# Patient Record
Sex: MALE | Race: WHITE
[De-identification: names, ages, dates, MRNs, and addresses within clinical notes are randomized per-mention and may not be internally consistent; named-entity substitution may affect disease eponyms.]

---

## 2017-12-13 ENCOUNTER — HOSPITAL ENCOUNTER (EMERGENCY)
Dept: HOSPITAL 17 - NED | Age: 52
Discharge: LEFT BEFORE DISCHARGE | End: 2017-12-13
Payer: SELF-PAY

## 2017-12-13 ENCOUNTER — HOSPITAL ENCOUNTER (OUTPATIENT)
Dept: HOSPITAL 17 - NEPC | Age: 52
Setting detail: OBSERVATION
LOS: 1 days | Discharge: HOME | End: 2017-12-14
Attending: INTERNAL MEDICINE | Admitting: INTERNAL MEDICINE
Payer: SELF-PAY

## 2017-12-13 VITALS
HEART RATE: 94 BPM | OXYGEN SATURATION: 97 % | SYSTOLIC BLOOD PRESSURE: 200 MMHG | TEMPERATURE: 98.9 F | DIASTOLIC BLOOD PRESSURE: 117 MMHG

## 2017-12-13 VITALS
TEMPERATURE: 97.8 F | SYSTOLIC BLOOD PRESSURE: 168 MMHG | RESPIRATION RATE: 17 BRPM | OXYGEN SATURATION: 99 % | DIASTOLIC BLOOD PRESSURE: 89 MMHG | HEART RATE: 83 BPM

## 2017-12-13 VITALS
HEART RATE: 89 BPM | OXYGEN SATURATION: 99 % | SYSTOLIC BLOOD PRESSURE: 197 MMHG | TEMPERATURE: 98.2 F | DIASTOLIC BLOOD PRESSURE: 107 MMHG | RESPIRATION RATE: 16 BRPM

## 2017-12-13 VITALS — BODY MASS INDEX: 29.98 KG/M2 | WEIGHT: 202.38 LBS | HEIGHT: 69 IN

## 2017-12-13 VITALS — HEART RATE: 88 BPM

## 2017-12-13 VITALS
OXYGEN SATURATION: 96 % | TEMPERATURE: 97.4 F | HEART RATE: 84 BPM | RESPIRATION RATE: 18 BRPM | DIASTOLIC BLOOD PRESSURE: 99 MMHG | SYSTOLIC BLOOD PRESSURE: 172 MMHG

## 2017-12-13 DIAGNOSIS — Z79.899: ICD-10-CM

## 2017-12-13 DIAGNOSIS — R06.02: ICD-10-CM

## 2017-12-13 DIAGNOSIS — R07.89: Primary | ICD-10-CM

## 2017-12-13 DIAGNOSIS — R94.31: ICD-10-CM

## 2017-12-13 DIAGNOSIS — M10.9: ICD-10-CM

## 2017-12-13 DIAGNOSIS — R53.83: ICD-10-CM

## 2017-12-13 DIAGNOSIS — I10: ICD-10-CM

## 2017-12-13 DIAGNOSIS — F17.200: ICD-10-CM

## 2017-12-13 DIAGNOSIS — R07.9: Primary | ICD-10-CM

## 2017-12-13 DIAGNOSIS — M54.31: ICD-10-CM

## 2017-12-13 DIAGNOSIS — Z82.49: ICD-10-CM

## 2017-12-13 LAB
ALP SERPL-CCNC: 92 U/L (ref 45–117)
ALT SERPL-CCNC: 29 U/L (ref 12–78)
ANION GAP SERPL CALC-SCNC: 8 MEQ/L (ref 5–15)
APTT BLD: 28.5 SEC (ref 24.3–30.1)
AST SERPL-CCNC: 21 U/L (ref 15–37)
BASOPHILS # BLD AUTO: 0.1 TH/MM3 (ref 0–0.2)
BASOPHILS NFR BLD: 1.4 % (ref 0–2)
BILIRUB SERPL-MCNC: 0.2 MG/DL (ref 0.2–1)
BUN SERPL-MCNC: 13 MG/DL (ref 7–18)
CHLORIDE SERPL-SCNC: 102 MEQ/L (ref 98–107)
CK MB SERPL-MCNC: 2.9 NG/ML (ref 0.5–3.6)
CK MB SERPL-MCNC: 3.3 NG/ML (ref 0.5–3.6)
CK MB SERPL-MCNC: 4.2 NG/ML (ref 0.5–3.6)
CK SERPL-CCNC: 172 U/L (ref 39–308)
CK SERPL-CCNC: 174 U/L (ref 39–308)
CK SERPL-CCNC: 211 U/L (ref 39–308)
EOSINOPHIL # BLD: 0.1 TH/MM3 (ref 0–0.4)
EOSINOPHIL NFR BLD: 0.6 % (ref 0–4)
ERYTHROCYTE [DISTWIDTH] IN BLOOD BY AUTOMATED COUNT: 13.4 % (ref 11.6–17.2)
GFR SERPLBLD BASED ON 1.73 SQ M-ARVRAT: 97 ML/MIN (ref 89–?)
HCO3 BLD-SCNC: 25.4 MEQ/L (ref 21–32)
HCT VFR BLD CALC: 40.7 % (ref 39–51)
HEMO FLAGS: (no result)
INR PPP: 1.1 RATIO
LYMPHOCYTES # BLD AUTO: 2.5 TH/MM3 (ref 1–4.8)
LYMPHOCYTES NFR BLD AUTO: 26.8 % (ref 9–44)
MAGNESIUM SERPL-MCNC: 2 MG/DL (ref 1.5–2.5)
MCH RBC QN AUTO: 29.7 PG (ref 27–34)
MCHC RBC AUTO-ENTMCNC: 34.1 % (ref 32–36)
MCV RBC AUTO: 86.9 FL (ref 80–100)
MONOCYTES NFR BLD: 7.3 % (ref 0–8)
NEUTROPHILS # BLD AUTO: 6 TH/MM3 (ref 1.8–7.7)
NEUTROPHILS NFR BLD AUTO: 63.9 % (ref 16–70)
PLATELET # BLD: 405 TH/MM3 (ref 150–450)
POTASSIUM SERPL-SCNC: 3.8 MEQ/L (ref 3.5–5.1)
PROTHROMBIN TIME: 10.7 SEC (ref 9.8–11.6)
RBC # BLD AUTO: 4.69 MIL/MM3 (ref 4.5–5.9)
SODIUM SERPL-SCNC: 135 MEQ/L (ref 136–145)
WBC # BLD AUTO: 9.3 TH/MM3 (ref 4–11)

## 2017-12-13 PROCEDURE — A9502 TC99M TETROFOSMIN: HCPCS

## 2017-12-13 PROCEDURE — 82552 ASSAY OF CPK IN BLOOD: CPT

## 2017-12-13 PROCEDURE — 85730 THROMBOPLASTIN TIME PARTIAL: CPT

## 2017-12-13 PROCEDURE — 84484 ASSAY OF TROPONIN QUANT: CPT

## 2017-12-13 PROCEDURE — 93005 ELECTROCARDIOGRAM TRACING: CPT

## 2017-12-13 PROCEDURE — 93017 CV STRESS TEST TRACING ONLY: CPT

## 2017-12-13 PROCEDURE — 85025 COMPLETE CBC W/AUTO DIFF WBC: CPT

## 2017-12-13 PROCEDURE — 85610 PROTHROMBIN TIME: CPT

## 2017-12-13 PROCEDURE — 99285 EMERGENCY DEPT VISIT HI MDM: CPT

## 2017-12-13 PROCEDURE — 82550 ASSAY OF CK (CPK): CPT

## 2017-12-13 PROCEDURE — 99284 EMERGENCY DEPT VISIT MOD MDM: CPT

## 2017-12-13 PROCEDURE — 71020: CPT

## 2017-12-13 PROCEDURE — 96374 THER/PROPH/DIAG INJ IV PUSH: CPT

## 2017-12-13 PROCEDURE — 80053 COMPREHEN METABOLIC PANEL: CPT

## 2017-12-13 PROCEDURE — 83735 ASSAY OF MAGNESIUM: CPT

## 2017-12-13 PROCEDURE — G0378 HOSPITAL OBSERVATION PER HR: HCPCS

## 2017-12-13 PROCEDURE — 78452 HT MUSCLE IMAGE SPECT MULT: CPT

## 2017-12-13 NOTE — PD
Dr. Ryan at bedside speaking with patient and family.    HPI


Chief Complaint:  Chest Pain


Time Seen by Provider:  11:30


Travel History


International Travel<30 days:  No


Contact w/Intl Traveler<30days:  No


Traveled to known affect area:  No





History of Present Illness


HPI


Patient is a 52-year-old male presenting to the emergency department for 

evaluation of chest pain and shortness of breath.  Patient states he gets 

fatigued when he walks.  This is an ongoing for the last 2 weeks.  He denies 

any upper respiratory symptoms.  He further denies any nausea, vomiting, fever, 

chills.  He reports a history of hypertension.  Patient is a smoker.





[facility]


Past Medical History


Diminished Hearing:  No


Gout:  Yes


Hypertension:  Yes





Past Surgical History


Appendectomy:  Yes


Other Surgery:  Yes (BROEKN ARM TWICE AND BROKEN ANKLE )





Social History


Alcohol Use:  Yes (SIX PACK BEER ONCE A WEEK)


Tobacco Use:  Yes (PACK A DAY)


Substance Use:  No





Allergies-Medications


(Allergen,Severity, Reaction):  


Coded Allergies:  


     No Known Allergies (Verified  Allergy, Unknown, 12/13/17)


Reported Meds & Prescriptions





Reported Meds & Active Scripts


Active


Norvasc (Amlodipine Besylate) 10 Mg Tab 10 Mg PO DAILY 30 Days








Review of Systems


Except as stated in HPI:  all other systems reviewed are Neg


General / Constitutional:  No: Fever, Chills


Eyes:  No: Blurred Vision


HENT:  No: Headaches


Cardiovascular:  Positive: Chest Pain or Discomfort


Respiratory:  Positive: Shortness of Breath


Gastrointestinal:  No: Nausea, Vomiting, Abdominal Pain


Musculoskeletal:  No: Myalgias





Physical Exam


Narrative


GENERAL: Well-developed, well-nourished, alert  male.  Observed in no 

acute distress.


SKIN: Warm and dry.


HEAD: Normocephalic.


EYES: No scleral icterus. No injection or drainage. 


CARDIOVASCULAR: Regular rate 


RESPIRATORY: No accessory muscle use, no nasal flaring, regular respiratory rate


MUSCULOSKELETAL: No cyanosis, or edema. 











Data


Data


Last Documented VS





Vital Signs








  Date Time  Temp Pulse Resp B/P (MAP) Pulse Ox O2 Delivery O2 Flow Rate FiO2


 


12/13/17 12:29        


 


12/13/17 11:40 98.9 94   97   








Orders





 Orders


Electrocardiogram (12/13/17 11:44)


Ckmb (Isoenzyme) Profile (12/13/17 11:44)


Complete Blood Count With Diff (12/13/17 11:44)


Comprehensive Metabolic Panel (12/13/17 11:44)


Magnesium (Mg) (12/13/17 11:44)


Prothrombin Time / Inr (Pt) (12/13/17 11:44)


Act Partial Throm Time (Ptt) (12/13/17 11:44)


Troponin I (12/13/17 11:44)


CKMB (12/13/17 12:00)


CKMB% (12/13/17 12:00)





Labs





Laboratory Tests








Test


  12/13/17


12:00


 


White Blood Count 9.3 TH/MM3 


 


Red Blood Count 4.69 MIL/MM3 


 


Hemoglobin 13.9 GM/DL 


 


Hematocrit 40.7 % 


 


Mean Corpuscular Volume 86.9 FL 


 


Mean Corpuscular Hemoglobin 29.7 PG 


 


Mean Corpuscular Hemoglobin


Concent 34.1 % 


 


 


Red Cell Distribution Width 13.4 % 


 


Platelet Count 405 TH/MM3 


 


Mean Platelet Volume 6.8 FL 


 


Neutrophils (%) (Auto) 63.9 % 


 


Lymphocytes (%) (Auto) 26.8 % 


 


Monocytes (%) (Auto) 7.3 % 


 


Eosinophils (%) (Auto) 0.6 % 


 


Basophils (%) (Auto) 1.4 % 


 


Neutrophils # (Auto) 6.0 TH/MM3 


 


Lymphocytes # (Auto) 2.5 TH/MM3 


 


Monocytes # (Auto) 0.7 TH/MM3 


 


Eosinophils # (Auto) 0.1 TH/MM3 


 


Basophils # (Auto) 0.1 TH/MM3 


 


CBC Comment DIFF FINAL 


 


Differential Comment  


 


Prothrombin Time 10.7 SEC 


 


Prothromb Time International


Ratio 1.1 RATIO 


 


 


Activated Partial


Thromboplast Time 28.5 SEC 


 


 


Blood Urea Nitrogen 13 MG/DL 


 


Creatinine 0.83 MG/DL 


 


Random Glucose 109 MG/DL 


 


Total Protein 9.0 GM/DL 


 


Albumin 3.6 GM/DL 


 


Calcium Level 9.0 MG/DL 


 


Magnesium Level 2.0 MG/DL 


 


Alkaline Phosphatase 92 U/L 


 


Aspartate Amino Transf


(AST/SGOT) 21 U/L 


 


 


Alanine Aminotransferase


(ALT/SGPT) 29 U/L 


 


 


Total Bilirubin 0.2 MG/DL 


 


Sodium Level 135 MEQ/L 


 


Potassium Level 3.8 MEQ/L 


 


Chloride Level 102 MEQ/L 


 


Carbon Dioxide Level 25.4 MEQ/L 


 


Anion Gap 8 MEQ/L 


 


Estimat Glomerular Filtration


Rate 97 ML/MIN 


 


 


Total Creatine Kinase 211 U/L 


 


Creatine Kinase MB 4.2 NG/ML 


 


Troponin I


  LESS THAN 0.02


NG/ML











MDM


Medical Decision Making


Medical Screen Exam Complete:  Yes


Emergency Medical Condition:  Yes


Interpretation(s)





Vital Signs








  Date Time  Temp Pulse Resp B/P (MAP) Pulse Ox O2 Delivery O2 Flow Rate FiO2


 


12/13/17 12:29        


 


12/13/17 11:40 98.9 94  200/117 (144) 97   








Differential Diagnosis


ACS versus stable angina versus pleurisy versus pneumonia versus non-STEMI 

versus other


Narrative Course


Patient was seen in triage, protocol orders placed.  When the patient was 

called for his room he was not to be found in the waiting rooms.





AMA: The risks of leaving against medical advice without further evaluation 

treatment were discussed with the patient.  These risks include cardiac 

dysfunction, cardiac dysrhythmia, possible heart attack, possible stroke or 

death.  The patient indicated understanding of these risks and appeared to have 

the capacity to make this decision.





Diagnosis





 Primary Impression:  


 Left against medical advice


Disposition:  07 AGAINST MEDICAL ADVICE











Smiley Olivares Dec 13, 2017 13:21

## 2017-12-13 NOTE — HHI.HP
HPI


Primary Care Physician


No Primary Care Physician


Chief Complaint


Chest pressure


History of Present Illness


52-year-old male with history of hypertension, current smoker, and gout 

presents to emergency room for further evaluation of chest pressure.  Reports 

getting over an upper respiratory infection and felt chest pressure related to 

cold. However this morning developed severe pressure worse than previous week, 

therefore came to ER for further evaluation. Location substernal and midback 

discomfort "felt like someone squeezing me."  Characterized as pressure. 

Severity moderate.  Radiation to left arm described left arm has "tingling." 

Associated symptoms dyspnea.  Denied nausea, vomiting, or diaphoresis.  

Duration waxed and waned in intensity. Initial episode lasted approximately 5 

minutes.  No known precipitating or relieving factors.  Denies similar pain in 

the past. Endorses he also suffered from claustrophobia and the squeezing 

sensation made him extremely anxious. Also reporting right sciatic pain x1 

month. Reports he is unable to get comfortable despite alternating ice, heat, 

and using Motrin. Does not have a PCP at this time. No weakness of extremity.





Review of Systems


General: No fatigue,weakness, fever, chills, or change in appetite.  Recent 

illness 1 week, reporting symptoms are resolving.


HEENT: No HA, no vision changes, no nasal congestion or drainage, no dysphasia


CV: As stated above.  Really chest pain-free. 


RESP: Dyspnea has resolved.  Recently upper respiratory infection, current 

smoker. No sputum production.


GI: No nausea or vomiting, bowel changes, diarrhea, constipation, pain, 

distention, melena, blood in the stool.  No unintentional weight gain or weight 

loss.


: No dysuria, urgency, frequency


EXT: No lower leg edema, no paraesthesias, reporting history of right sciatic 

pain x1 month


MS: No discomfort or change in ROM. No known injury or trauma. No past cervical 

or lumbar problems. History of DDD. 


NEURO: No difficulty with balance, LOC, motor/sensory deficits


PSYCH: No anxiety, depression.


SKIN: No rashes, no concerning lesions





Past Family Social History


Allergies:  


Coded Allergies:  


     No Known Allergies (Verified  Allergy, Unknown, 17)


Past Medical History


Hypertension (obtains his BP medication from a friend), gout


Past Surgical History


None


Reported Medications





Reported Meds & Active Scripts


Active


Reported


Norvasc (Amlodipine Besylate) 10 Mg Tab 10 Mg PO DAILY


MVI QD


Active Ordered Medications





Current Medications








 Medications


  (Trade)  Dose


 Ordered  Sig/Mary


 Route  Start Time


 Stop Time Status Last Admin


 


  (NS Flush)  2 ml  UNSCH  PRN


 IV FLUSH  17 15:15


     


 


 


  (NS Flush)  2 ml  BID


 IV FLUSH  17 21:00


     


 


 


  (Tylenol)  500 mg  Q4H  PRN


 PO  17 15:15


     


 


 


  (Zofran Inj)  4 mg  Q6H  PRN


 IV PUSH  17 15:15


     


 


 


  (Nitrostat Sl)  0.4 mg  Q5M  PRN


 SL  17 15:15


     


 


 


  (Aspirin)  325 mg  DAILY


 PO  17 09:00


     


 








Family History


Noncontributory for early onset cardiovascular disease.  Father  CABG later in 

life, still living.


Social History


Known hypertension.  No known coronary artery disease, diabetes, or 

hyperlipidemia.


Current smoker, 25-45 pack year history.  Denies any alcohol or illegal drug 

use.


Versus inactive lifestyle.  Works part-time as a [employer].





Past cardiac testing


None





Physical Exam


Vital Signs





Vital Signs








  Date Time  Temp Pulse Resp B/P (MAP) Pulse Ox O2 Delivery O2 Flow Rate FiO2


 


17 14:41   20   Room Air  


 


17 13:23 98.2 89 16 197/107 (137) 99   








Physical Exam


GENERAL: Alert WN, WD, NAD,  male


HEAD: NC, AT


EYES: Sclera clear, conjunctiva without injection, pupils equal and round


ENT: Mucous membranes pink and moist


CV: RRR, without murmur, rub, gallop, no JVD, S1-S2 no S3-S4.  Chest wall pain 

left anterior and substernal with palpation.


RESP: Coarse bilateral lower lobes, Clear lungs upper. No wheeze. symmetrical 

chest rise, nonlabored, able to speak in full sentences


ABD: Soft, NT, ND, no masses, positive bowel tones


EXT: Pulses +24, no dependent edema


MS: Normal tone 4 extremities, nontender, no obvious deformities, full range 

of motion


NEURO: CN II through CN XII grossly intact, motor strength 5/5


PSYCH: A+O 3, appropriate speech, mood, affect, insight and judgment, appears 

mildly anxious.


SKIN: Normal turgor, normal texture, no lesions, no rashes, brisk cap refill, 

even hair distribution


Laboratory


CBC unremarkable, CMP unremarkable.  First troponin negative.


Imaging





Last Impressions








Chest X-Ray 17 0000 Signed





Impressions: 





 Service Date/Time:  2017 13:41 - CONCLUSION:  1. No 





 acute cardiopulmonary disease.     Alexandre Rock MD 








Course


EKG


Normal sinus rhythm, normal axis, no ST or T-segment changes





Caprini VTE Risk Assessment


Caprini VTE Risk Assessment:  No/Low Risk (score <= 1)


Caprini Risk Assessment Model











 Point Value = 1          Point Value = 2  Point Value = 3  Point Value = 5


 


Age 41-60


Minor surgery


BMI > 25 kg/m2


Swollen legs


Varicose veins


Pregnancy or postpartum


History of unexplained or recurrent


   spontaneous 


Oral contraceptives or hormone


   replacement


Sepsis (< 1 month)


Serious lung disease, including


   pneumonia (< 1 month)


Abnormal pulmonary function


Acute myocardial infarction


Congestive heart failure (< 1 month)


History of inflammatory bowel disease


Medical patient at bed rest Age 61-74


Arthroscopic surgery


Major open surgery (> 45 min)


Laparoscopic surgery (> 45 min)


Malignancy


Confined to bed (> 72 hours)


Immobilizing plaster cast


Central venous access Age >= 75


History of VTE


Family history of VTE


Factor V Leiden


Prothrombin 81935D


Lupus anticoagulant


Anticardiolipin antibodies


Elevated serum homocysteine


Heparin-induced thrombocytopenia


Other congenital or acquired


   thrombophilia Stroke (< 1 month)


Elective arthroplasty


Hip, pelvis, or leg fracture


Acute spinal cord injury (< 1 month)








Prophylaxis Regimen











   Total Risk


Factor Score Risk Level Prophylaxis Regimen


 


0-1      Low Early ambulation


 


2 Moderate Order ONE of the following:


*Sequential Compression Device (SCD)


*Heparin 5000 units SQ BID


 


3-4 Higher Order ONE of the following medications:


*Heparin 5000 units SQ TID


*Enoxaparin/Lovenox 40 mg SQ daily (WT < 150 kg, CrCl > 30 mL/min)


*Enoxaparin/Lovenox 30 mg SQ daily (WT < 150 kg, CrCl > 10-29 mL/min)


*Enoxaparin/Lovenox 30 mg SQ BID (WT < 150 kg, CrCl > 30 mL/min)


AND/OR


*Sequential Compression Device (SCD)


 


5 or more Highest Order ONE of the following medications:


*Heparin 5000 units SQ TID (Preferred with Epidurals)


*Enoxaparin/Lovenox 40 mg SQ daily (WT < 150 kg, CrCl > 30 mL/min)


*Enoxaparin/Lovenox 30 mg SQ daily (WT < 150 kg, CrCl > 10-29 mL/min)


*Enoxaparin/Lovenox 30 mg SQ BID (WT < 150 kg, CrCl > 30 mL/min)


AND


*Sequential Compression Device (SCD)











Assessment and Plan


Assessment and Plan


#1 Atypical chest pain-admitted chest pain center.  Ruled out with 3 sets of 

EKGs, cardiac enzymes, and monitored overnight.  Seen and evaluated by Dr. Lukasz Wood.  If ruled out, plan is to complete a chemical stress test in 

a.m.  If cardiac testing unremarkable, plans to discharge home. Encouraged and 

stressed the importance of establishing with a primary care provider.  

Discussed in length local health clinic's available to the uninsured. 


#2 Hypertension-continue amlodipine, refill prescription at discharge. 


#3 Tobacco use-strongly encouraged and stressed the importance of tobacco 

cessation.  Instructed him to quit smoking.


#4 Right sciatic pain-Toradol 30mg IVx1 dose now











Kaylin Tanner Dec 13, 2017 15:55

## 2017-12-13 NOTE — RADRPT
EXAM DATE/TIME:  12/13/2017 13:41 

 

[facility] COMPARISON:     

CHEST PA & LAT, November 16, 2012, 16:52.

 

                     

INDICATIONS :     

Chest pain.

                     

 

MEDICAL HISTORY :     

Hypertension.          

 

SURGICAL HISTORY :     

None.   

 

ENCOUNTER:     

Initial                                        

 

ACUITY:     

3 days      

 

PAIN SCORE:     

8/10

 

LOCATION:     

Left upper chest 

 

FINDINGS:     

PA and lateral views of the chest demonstrate the lungs to be symmetrically aerated without evidence 
of mass, infiltrate or effusion.  The cardiomediastinal contours are unremarkable.  Osseous structure
s are intact.

 

CONCLUSION:     

1. No acute cardiopulmonary disease.

 

 

 

 Alexandre Rock MD on December 13, 2017 at 14:04           

Board Certified Radiologist.

 This report was verified electronically.

## 2017-12-13 NOTE — EKG
Date Performed: 12/13/2017       Time Performed: 11:56:36

 

PTAGE:      52 years

 

EKG:      Sinus rhythm 

 

 NORMAL ECG 

 

NO PREVIOUS TRACING            

 

DOCTOR:   Eliseo Toth  Interpretating Date/Time  12/13/2017 14:23:54

## 2017-12-13 NOTE — PD
HPI


Chief Complaint:  Chest Pain


Time Seen by Provider:  14:29


Travel History


International Travel<30 days:  No


Contact w/Intl Traveler<30days:  No


Traveled to known affect area:  No





History of Present Illness


HPI


52-year-old male that presents to the ED for evaluation of left-sided chest 

pain.  Per patient she's had the chest pain on and off for the past 2 weeks but 

worsened yesterday night.  Per patient yesterday felt like he was pressure on 

his back and chest as well as some shortness of breath.  He is never had this 

before.  Per patient he did not lean a call for the past 2 weeks and he was 

getting better but yesterday the symptoms worsen which is what prompted 

evaluation.  Per patient he also had the shortness of breath and chest pain 

today and took an ibuprofen with minimal relief.  He does have a family history 

of heart disease including his father having had a heart attack younger than 

him.  He is a smoker and he has had blood pressure issues but she surgery he 

has high cholesterol or diabetes but he states that he is borderline diabetic 

and his cholesterol is likely high.  He states that the pain currently is 4 out 

of 10 and does radiate to the left arm causing a numbness and tingling 

sensation.  He also has pain on his left pot but he believes that this is 

related to his degenerative joint disease.  He has never been seen for his 

heart before.  He denies any cardiac history on himself.  He has no allergies 

to medication.  No other medical problems.





PFSH


Past Medical History


Cardiovascular Problems:  Yes (HIGH BP)


Diminished Hearing:  No


Gout:  Yes


Hypertension:  Yes





Past Surgical History


Appendectomy:  Yes


Other Surgery:  Yes (BROEKN ARM TWICE AND BROKEN ANKLE )





Social History


Alcohol Use:  No


Tobacco Use:  Yes (PACK A DAY)


Substance Use:  Yes (SPEED "EVERY ONCE IN A WHILE")





Allergies-Medications


(Allergen,Severity, Reaction):  


Coded Allergies:  


     No Known Allergies (Verified  Adverse Reaction, Unknown, 12/13/17)


Reported Meds & Prescriptions





Reported Meds & Active Scripts


Active


Reported


Norvasc (Amlodipine Besylate) 10 Mg Tab 10 Mg PO DAILY








Review of Systems


Except as stated in HPI:  all other systems reviewed are Neg





Physical Exam


Narrative


GENERAL: 


SKIN: Warm and dry.


HEAD: Atraumatic. Normocephalic. 


EYES: Pupils equal and round. No scleral icterus. No injection or drainage. 


ENT: No nasal bleeding or discharge.  Mucous membranes pink and moist.  Tongue 

is midline.  No uvula deviation.


NECK: Trachea midline. No JVD. 


CARDIOVASCULAR: Regular rate and rhythm.  No murmurs, S3, S4.


RESPIRATORY: No accessory muscle use. Clear to auscultation. Breath sounds 

equal bilaterally. 


GASTROINTESTINAL: Abdomen soft, non-tender, nondistended. Hepatic and splenic 

margins not palpable. 


MUSCULOSKELETAL: Extremities without clubbing, cyanosis, or edema. No obvious 

deformities.  Full range of motion of the upper and lower extremities 

bilaterally.  2+ pulses bilaterally.


NEUROLOGICAL: Awake and alert. No obvious cranial nerve deficits.  Motor 

grossly within normal limits. Five out of 5 muscle strength in the arms and 

legs.  Normal speech.


PSYCHIATRIC: Appropriate mood and affect; insight and judgment normal.





Data


Data


Last Documented VS





Vital Signs








  Date Time  Temp Pulse Resp B/P (MAP) Pulse Ox O2 Delivery O2 Flow Rate FiO2


 


12/13/17 14:41   20   Room Air  


 


12/13/17 13:23 98.2 89  197/107 (137) 99   








Orders





 Orders


Chest, Pa & Lat (12/13/17 )


Aspirin (Aspirin) (12/13/17 15:00)


Nitroglycerin Sl (Nitrostat Sl) (12/13/17 15:00)


Admit Order (Ed Use Only) (12/13/17 14:46)








MDM


Medical Decision Making


Medical Screen Exam Complete:  Yes


Emergency Medical Condition:  Yes


Medical Record Reviewed:  Yes


Interpretation(s)


EKG shows sinus rhythm with no sign of acute ischemia or arrhythmia read by me 

and attending.





Last Impressions








Chest X-Ray 12/13/17 0000 Signed





Impressions: 





 Service Date/Time:  Wednesday, December 13, 2017 13:41 - CONCLUSION:  1. No 





 acute cardiopulmonary disease.     Alexandre Rock MD 





CBC and BMP as well as CK and troponin from previous visit today were negative.


Differential Diagnosis


Chest pain versus atypical chest pain versus normal exam versus ACS


Narrative Course


52-year-old male that presents to the ED for evaluation of chest pain.  Patient 

was properly examined and was found to have signs and symptoms concerning for 

ACS.  I did review the patient's medical records.  He apparently he left AMA 

less than an hour ago before coming back and had a full set of blood work that 

was already done.  EKG and troponin at the time were negative.  Patient had 

chest x-ray done also as a protocol was negative.  Patient has multiple risk 

factors including smoking, HTN, age, family history.  Definite concerning for 

ACS.  The recommend admission for chest pain center for further evaluation and 

treatment.  Patient is in agreement with this plan.  Patient will be admitted 

to the chest pain center.





Diagnosis





 Primary Impression:  


 Chest pain in adult





Admitting Information


Admitting Physician Requests:  Wesley Perez Dec 13, 2017 15:01

## 2017-12-14 VITALS
RESPIRATION RATE: 17 BRPM | DIASTOLIC BLOOD PRESSURE: 111 MMHG | TEMPERATURE: 97.9 F | SYSTOLIC BLOOD PRESSURE: 176 MMHG | HEART RATE: 82 BPM | OXYGEN SATURATION: 99 %

## 2017-12-14 VITALS — DIASTOLIC BLOOD PRESSURE: 107 MMHG | SYSTOLIC BLOOD PRESSURE: 178 MMHG

## 2017-12-14 VITALS
TEMPERATURE: 97.7 F | RESPIRATION RATE: 16 BRPM | HEART RATE: 71 BPM | SYSTOLIC BLOOD PRESSURE: 169 MMHG | OXYGEN SATURATION: 96 % | DIASTOLIC BLOOD PRESSURE: 97 MMHG

## 2017-12-14 VITALS — HEART RATE: 84 BPM

## 2017-12-14 VITALS — HEART RATE: 80 BPM

## 2017-12-14 VITALS — HEART RATE: 60 BPM

## 2017-12-14 RX ADMIN — Medication SCH ML: at 08:00

## 2017-12-14 RX ADMIN — Medication SCH ML: at 03:19

## 2017-12-14 NOTE — HHI.DCPOC
Discharge Care Plan


Diagnosis:  


(1) Chest pain


(2) Hypertension


(3) Tobacco abuse


Goals to Promote Your Health


* To prevent worsening of your condition and complications


* To maintain your health at the optimal level


Directions to Meet Your Goals


*** Take your medications as prescribed


*** Follow your dietary instruction


*** Follow activity as directed








*** Keep your appointments as scheduled


*** Take your immunizations and boosters as scheduled


*** If your symptoms worsen call your PCP, if no PCP go to Urgent Care Center 

or Emergency Room***


*** Smoking is Dangerous to Your Health. Avoid second hand smoke***


***Call the 24-hour hour crisis hotline for domestic abuse at 1-574.127.1715***











Carlos Arredondo Dec 14, 2017 13:32

## 2017-12-14 NOTE — RADRPT
EXAM DATE/TIME:  12/14/2017 11:09 

 

[facility] COMPARISON:     

No previous studies available for comparison.

 

 

INDICATIONS :     

Mid chest pain for one day. Angina. 

                           

 

DOSE:     

25.8 mCi Tc99m Myoview at stress.

                     8.7 mCi Tc99m Myoview at rest.

                     0.4 mg Lexiscan

                       

 

 

STRESS SYMPTOMS:      

Shortness of breath, chest pressure. 

                       

 

 

EJECTION FRACTION:       

56%

                       

 

MEDICAL HISTORY :     

Hypertension.   

 

SURGICAL HISTORY :      

None.   

 

ENCOUNTER:     

Initial

 

ACUITY:     

1 day

 

PAIN SCALE:     

5/10

 

LOCATION:      

Midsternal chest 

 

TECHNIQUE:     

The patient underwent pharmacologic stress with infusion of prescribed dose.  Continuous ECG tracing 
was monitored during stress.  Gated SPECT imaging was performed after stress and conventional SPECT i
maging was performed at rest.  The examination was performed on a SPECT/CT scanner, both attenuation 
and non-corrected datasets were reviewed.

 

FINDINGS:     

 

DISTRIBUTION:     

The maximum perfused segment at stress is in the anterolateral wall.

 

PERFUSION STUDY:     

The pattern of perfusion at stress is within normal limits.

 

GATED STUDY:     

There is intact wall motion and thickening without hypokinetic or dyskinetic segments. 

 

CONCLUSION:     

No reversible perfusion defect to suggest stress induced myocardial ischemia is identified. Ejection 
fraction is within the range of normal.

 

RISK CATEGORY:     Low (<1% Annual Mortality Rate)

 

 

 

 

 Beck Cat MD on December 14, 2017 at 13:21           

Board Certified Radiologist.

 This report was verified electronically.

## 2017-12-15 NOTE — EKG
Date Performed: 12/13/2017       Time Performed: 17:58:44

 

PTAGE:      52 years

 

EKG:      Sinus rhythm 

 

 NORMAL ECG

 

PREVIOUS TRACING       : 12/13/2017 15.36 Since previous tracing, no significant change noted

 

DOCTOR:   Lukasz Wood  Interpretating Date/Time  12/15/2017 08:51:39

## 2017-12-15 NOTE — EKG
Date Performed: 12/13/2017       Time Performed: 15:36:06

 

PTAGE:      52 years

 

EKG:      Sinus rhythm 

 

 NORMAL ECG

 

PREVIOUS TRACING       : 12/13/2017 11.56 Since previous tracing, no significant change noted

 

DOCTOR:   Lukasz Wood  Interpretating Date/Time  12/15/2017 08:52:13

## 2017-12-15 NOTE — EKG
Date Performed: 12/14/2017       Time Performed: 09:49:09

 

PTAGE:      52 years

 

EKG:      ATRIAL FIBRILLATION WITH ABERRANT CONDUCTION OR VENTRICULAR PREMATURE COMPLEXES  LEFT AXIS 
DEVIATION ABNORMAL ECG

 

PREVIOUS TRACING       : 12/13/2017 17.58 Since previous tracing, atrial fibrillation is now preent

 

DOCTOR:   Lukasz Wood  Interpretating Date/Time  12/15/2017 08:48:47

## 2017-12-15 NOTE — TR
Date Performed: 12/14/2017       Time Performed: 11:29:59

 

DOCTOR:      Lukasz Wood 

 

DRUG LIST:     

CLINICAL HISTORY:     

REASON FOR TEST:      Angina

REASON FOR ENDING:     

OBSERVATION:     

CONCLUSION:      Lexiscan stress test was performed under standard four minute protocol.  Radionuclid
e was injected one minute prior to ending the test. No electrocardiographic abormalities were present
 to suggest ischemia. Nuclear imaging and interpretation are pending.

COMMENTS:

## 2018-02-21 ENCOUNTER — HOSPITAL ENCOUNTER (EMERGENCY)
Dept: HOSPITAL 17 - NEPD | Age: 53
Discharge: HOME | End: 2018-02-21
Payer: SELF-PAY

## 2018-02-21 VITALS — WEIGHT: 202.83 LBS | HEIGHT: 69 IN | BODY MASS INDEX: 30.04 KG/M2

## 2018-02-21 VITALS
TEMPERATURE: 98.5 F | DIASTOLIC BLOOD PRESSURE: 109 MMHG | HEART RATE: 88 BPM | RESPIRATION RATE: 16 BRPM | OXYGEN SATURATION: 99 % | SYSTOLIC BLOOD PRESSURE: 189 MMHG

## 2018-02-21 DIAGNOSIS — M25.571: Primary | ICD-10-CM

## 2018-02-21 DIAGNOSIS — M10.9: ICD-10-CM

## 2018-02-21 DIAGNOSIS — M25.561: ICD-10-CM

## 2018-02-21 DIAGNOSIS — I10: ICD-10-CM

## 2018-02-21 DIAGNOSIS — R60.9: ICD-10-CM

## 2018-02-21 DIAGNOSIS — Z72.0: ICD-10-CM

## 2018-02-21 PROCEDURE — 73610 X-RAY EXAM OF ANKLE: CPT

## 2018-02-21 PROCEDURE — L1906 AFO MULTILIG ANK SUP PRE OTS: HCPCS

## 2018-02-21 PROCEDURE — 99284 EMERGENCY DEPT VISIT MOD MDM: CPT

## 2018-02-21 PROCEDURE — E0113 CRUTCH UNDERARM EACH WOOD: HCPCS

## 2018-02-21 PROCEDURE — 93971 EXTREMITY STUDY: CPT

## 2018-02-21 NOTE — RADRPT
EXAM DATE/TIME:  02/21/2018 12:26 

 

[facility] COMPARISON:     

No previous studies available for comparison.

 

                     

INDICATIONS :     

Swelling right ankle, no known injury

                     

 

MEDICAL HISTORY :            

hx of gout   

 

SURGICAL HISTORY :     

None.   

 

ENCOUNTER:     

Initial                                        

 

ACUITY:     

3 weeks      

 

PAIN SCORE:     

10/10

 

LOCATION:     

Right  ankle

 

FINDINGS:     

Three view exam was performed of the right ankle.  The bony structures are in normal alignment.  No e
vidence of fracture, dislocation. There is bimalleolar soft tissue swelling. The ankle mortise is int
act.  No radiopaque foreign bodies are seen.  Bony mineralization is normal.

 

CONCLUSION:     

Soft tissue swelling at the right ankle. No acute bony abnormality. 

 

 

 Warren Mares MD on February 21, 2018 at 12:45           

Board Certified Radiologist.

 This report was verified electronically.

## 2018-02-21 NOTE — RADRPT
EXAM DATE/TIME:  02/21/2018 12:05 

 

[facility] COMPARISON:     

No previous studies available for comparison.

        

 

 

INDICATIONS :                

Right leg pain. 

            

 

MEDICAL HISTORY :     

Hypertension.      Substance use. Gout. 

 

SURGICAL HISTORY :     

Appendectomy.    Orthopedic surgery left wrist and left ankle. 

 

ENCOUNTER:     

Initial

 

ACUITY:     

1 day

 

PAIN SCORE:      

7/10

 

LOCATION:      

Right  leg. 

            

                      

 

TECHNIQUE:     

Venous ultrasound of the leg was performed from the inguinal ligament to the proximal calf.  Real-chari
e, color Doppler and spectral tracing, compression and augmentation techniques were used.  

 

FINDINGS:     

There is normal compressibility of the deep venous system from the inguinal region to the proximal ca
lf.  No echogenic clot is seen in the lumen of the common femoral, femoral, popliteal, and posterior 
tibial veins.  There is a normal response of the venous system to proximal and distal augmentation an
d respiration.  

 

CONCLUSION:     

Normal examination.  

 

 

 

 Warren Mares MD on February 21, 2018 at 12:32           

Board Certified Radiologist.

 This report was verified electronically.

## 2018-02-21 NOTE — PD
HPI


Chief Complaint:  Edema


Time Seen by Provider:  12:57


Travel History


International Travel<30 days:  No


Contact w/Intl Traveler<30days:  No


Traveled to known affect area:  No





History of Present Illness


HPI


53-year-old male presents to emergency department with complaint of right ankle 

pain and swelling 3 weeks and also right knee pain 3 weeks.  Denies injury.  

Denies fever, vomiting.  Denies paresthesias, loss of sensation to the affected 

extremity.  Reports decreased range of motion at the ankle secondary to pain 

and swelling.  Has history of gout.  Rates pain 10/10.  Describes a throbbing 

and aching.  Has tried taking ibuprofen for symptom management.  Says he tried 

taking his indomethacin for gout also.  Has been using crutches for support.  

No primary care provider.  No known allergies.  History of gout and 

hypertension.  Has no other medical complaints.  No other modifying factors or 

associated signs and symptoms.





PFSH


Past Medical History


Cardiovascular Problems:  Yes (HIGH BP)


Diminished Hearing:  No


Gout:  Yes


Hypertension:  Yes





Past Surgical History


Appendectomy:  Yes


Other Surgery:  Yes (BROEKN ARM TWICE AND BROKEN ANKLE )





Social History


Alcohol Use:  No


Tobacco Use:  Yes (PACK A DAY)


Substance Use:  Yes (SPEED "EVERY ONCE IN A WHILE")





Allergies-Medications


(Allergen,Severity, Reaction):  


Coded Allergies:  


     No Known Allergies (Verified  Allergy, Unknown, 2/21/18)


Reported Meds & Prescriptions





Reported Meds & Active Scripts


Active


Ibuprofen 800 Mg Tab 800 Mg PO Q6HR PRN


Medrol Dosepak (Methylprednisolone) 4 Mg Dspk 4 Mg PO AS DIRECTED


     Per Pharmacist direction


Norvasc (Amlodipine Besylate) 10 Mg Tab 10 Mg PO DAILY 30 Days








Review of Systems


Except as stated in HPI:  all other systems reviewed are Neg





Physical Exam


Narrative


GENERAL: Well-nourished, well-developed  male patient, in no acute 

distress; afebrile, nontoxic-appearing


SKIN: Warm and dry.


HEAD: Atraumatic. Normocephalic. 


EYES: Pupils equal and round. No scleral icterus. No injection or drainage.


ENT: Mucosa pink and moist.  Airway patent.  


NECK: Trachea midline.  


CARDIOVASCULAR: Regular rate. 


RESPIRATORY: No accessory muscle use. 


GASTROINTESTINAL:  Rounded.


MUSCULOSKELETAL: Right ankle with point tenderness to the lateral and medial 

malleoli zone with palpation; with edema; without erythema, ecchymosis.  Right 

knee with full range of motion and with tenderness on palpation to the 

posterior aspect; with 90 flexion; joint stable; without erythema, edema, 

ecchymosis.  Right Lower extremity is supple and nontense with 2+ pedal pulse 

and sensory intact.  No obvious deformities. No clubbing.  No cyanosis.  


NEUROLOGICAL: Awake and alert.  Oriented 3.  No obvious cranial nerve 

deficits.  Motor grossly within normal limits. Normal speech. 


PSYCHIATRIC: Appropriate mood and affect; insight and judgment normal.





Data


Data


Last Documented VS





Vital Signs








  Date Time  Temp Pulse Resp B/P (MAP) Pulse Ox O2 Delivery O2 Flow Rate FiO2


 


2/21/18 10:28 98.5 88 16 189/109 (135) 99   








Orders





 Orders


Us Leg Venous Doppler (2/21/18 )


Ankle, Complete ([id number]) (2/21/18 )


Tramadol (Ultram) (2/21/18 13:15)


Splint Or Brace Apply/Monitor (2/21/18 13:12)


Ed Discharge Order (2/21/18 13:12)








[facility]


Medical Decision Making


Medical Screen Exam Complete:  Yes


Emergency Medical Condition:  Yes


Medical Record Reviewed:  Yes


Differential Diagnosis


Gout exacerbation, nonspecific ankle swelling, leg pain, DVT, ankle fracture


Narrative Course


53-year-old male with right ankle pain and swelling 3 weeks.  Denies injury.  

Has history of gout.  Patient is afebrile and nontoxic-appearing.  Denies fever

, vomiting.  Right ankle x-ray and venous Doppler ultrasound of the right lower 

extremity ordered in triage.


1300: Right ankle x-ray and right lower extremity venous Doppler ultrasound 

concludes:  











Lower Extremity Ultrasound 2/21/18 0000 Signed





Impressions: 





 Service Date/Time:  Wednesday, February 21, 2018 12:05 - CONCLUSION:  Normal 





 examination.       Warren Mares MD 


 


Ankle X-Ray 2/21/18 0000 Signed





Impressions: 





 Service Date/Time:  Wednesday, February 21, 2018 12:26 - CONCLUSION:  Soft 





 tissue swelling at the right ankle. No acute bony abnormality.     Warren Mares MD 





Discussed ultrasound and x-ray findings with the patient.  Discussed the 

patient with my attending physician, Dr. Rand, and he agrees with my plan of 

care.  Ace bandage, ankle stirrup splint provided for support.  Tramadol 

administered in the ER.  She has crutches for support.  Medrol Dosepak, 

ibuprofen prescribed for home.  Instructed patient to follow up with primary 

care provider.  Patient verbalizes understanding and agreement with treatment 

plan.  Patient is medically cleared and stable for discharge.  Discussed 

reasons to return to the emergency department.  Patient agrees with treatment 

plan.  The patients vital signs are stable and the patient is stable for 

outpatient follow-up and treatment.  Patient discharged home, stable and in no 

acute distress.





Diagnosis





 Primary Impression:  


 Pain and swelling of right ankle


 Additional Impression:  


 Right knee pain


 Qualified Codes:  M25.561 - Pain in right knee


Referrals:  


Orthopaedic Surgeon





Primary Care Physician


Patient Instructions:  Crutch Instructions (ED), General Instructions, Gout (ED)





***Additional Instructions:  


Tylenol or ibuprofen as directed and as needed for pain and inflammation


Rest, ice, compress, and elevate extremity to decrease pain and inflammation


Ankle Brace for support


Crutches for support


Avoid aggravating activity; increase activity as tolerated


Follow-up with primary care provider


Return to the emergency department immediately with worsening of symptoms


***Med/Other Pt SpecificInfo:  Prescription(s) given


Scripts


Ibuprofen (Ibuprofen) 800 Mg Tab


800 MG PO Q6HR Y for PAIN, #30 TAB 0 Refills


   Prov: Lisa Ramos         2/21/18 


Methylprednisolone Dosepak (Medrol Dosepak) 4 Mg Dspk


4 MG PO AS DIRECTED, #1 DSPK 0 Refills


   Per Pharmacist direction


   Prov: Lisa Ramos         2/21/18


Disposition:  01 DISCHARGE HOME


Condition:  Stable











Lisa Ramos Feb 21, 2018 13:00